# Patient Record
Sex: MALE | Race: ASIAN | NOT HISPANIC OR LATINO
[De-identification: names, ages, dates, MRNs, and addresses within clinical notes are randomized per-mention and may not be internally consistent; named-entity substitution may affect disease eponyms.]

---

## 2023-07-24 PROBLEM — Z00.00 ENCOUNTER FOR PREVENTIVE HEALTH EXAMINATION: Status: ACTIVE | Noted: 2023-07-24

## 2023-07-26 PROBLEM — F17.200 CURRENT SMOKER: Status: ACTIVE | Noted: 2023-07-26

## 2023-07-26 RX ORDER — ATORVASTATIN CALCIUM 10 MG/1
10 TABLET, FILM COATED ORAL
Refills: 0 | Status: ACTIVE | COMMUNITY

## 2023-07-26 RX ORDER — OMEGA-3-ACID ETHYL ESTERS CAPSULES 1 G/1
1 CAPSULE, LIQUID FILLED ORAL
Refills: 0 | Status: ACTIVE | COMMUNITY

## 2023-07-28 ENCOUNTER — APPOINTMENT (OUTPATIENT)
Dept: THORACIC SURGERY | Facility: CLINIC | Age: 58
End: 2023-07-28
Payer: MEDICAID

## 2023-07-28 VITALS
OXYGEN SATURATION: 99 % | HEIGHT: 66 IN | SYSTOLIC BLOOD PRESSURE: 116 MMHG | HEART RATE: 71 BPM | DIASTOLIC BLOOD PRESSURE: 68 MMHG | BODY MASS INDEX: 23.78 KG/M2 | TEMPERATURE: 96.9 F | WEIGHT: 148 LBS

## 2023-07-28 DIAGNOSIS — Z01.818 ENCOUNTER FOR OTHER PREPROCEDURAL EXAMINATION: ICD-10-CM

## 2023-07-28 DIAGNOSIS — F17.200 NICOTINE DEPENDENCE, UNSPECIFIED, UNCOMPLICATED: ICD-10-CM

## 2023-07-28 DIAGNOSIS — R91.1 SOLITARY PULMONARY NODULE: ICD-10-CM

## 2023-07-28 DIAGNOSIS — Z86.39 PERSONAL HISTORY OF OTHER ENDOCRINE, NUTRITIONAL AND METABOLIC DISEASE: ICD-10-CM

## 2023-07-28 PROCEDURE — 99205 OFFICE O/P NEW HI 60 MIN: CPT

## 2023-07-31 PROBLEM — Z86.39 HISTORY OF HYPERLIPIDEMIA: Status: RESOLVED | Noted: 2023-07-31 | Resolved: 2023-07-31

## 2023-07-31 NOTE — PHYSICAL EXAM
[General Appearance - Alert] : alert [General Appearance - In No Acute Distress] : in no acute distress [Sclera] : the sclera and conjunctiva were normal [PERRL With Normal Accommodation] : pupils were equal in size, round, and reactive to light [Extraocular Movements] : extraocular movements were intact [Outer Ear] : the ears and nose were normal in appearance [Oropharynx] : the oropharynx was normal [Neck Appearance] : the appearance of the neck was normal [Neck Cervical Mass (___cm)] : no neck mass was observed [Jugular Venous Distention Increased] : there was no jugular-venous distention [Thyroid Diffuse Enlargement] : the thyroid was not enlarged [Thyroid Nodule] : there were no palpable thyroid nodules [Auscultation Breath Sounds / Voice Sounds] : lungs were clear to auscultation bilaterally [Heart Rate And Rhythm] : heart rate was normal and rhythm regular [Heart Sounds] : normal S1 and S2 [Heart Sounds Gallop] : no gallops [Murmurs] : no murmurs [Heart Sounds Pericardial Friction Rub] : no pericardial rub [Abnormal Walk] : normal gait [Nail Clubbing] : no clubbing  or cyanosis of the fingernails [Musculoskeletal - Swelling] : no joint swelling seen [Motor Tone] : muscle strength and tone were normal [Skin Color & Pigmentation] : normal skin color and pigmentation [Skin Turgor] : normal skin turgor [] : no rash [Deep Tendon Reflexes (DTR)] : deep tendon reflexes were 2+ and symmetric [Sensation] : the sensory exam was normal to light touch and pinprick [No Focal Deficits] : no focal deficits [Oriented To Time, Place, And Person] : oriented to person, place, and time [Impaired Insight] : insight and judgment were intact [Affect] : the affect was normal

## 2023-08-04 NOTE — ASSESSMENT
[FreeTextEntry1] : 57 year old M, smoker (30y x 1ppd), with PMHx of HLD , who is referred by Dr. Rosangela Gonsalez for an initial evaluation of an enlarging ground-glass nodule in the right lower lobe.   Patient has had chronic unproductive cough, worse if smoking. There's no unintentional weight loss, headache, anorexia, fatigue, chest pain, dyspnea, or hemoptysis.  CT chest on 07/05/2023 was reviewed and compared to the prior study. Explained to the patient that the ground glass nodule in the RLL adjacent to an anterior thoracic osteophyte had been increasing in size, which bg up the concern of malignancy. I suggested surgical resection for definitive treatment and diagnosis.   The risks, benefits and alternatives of proceeding with Right Video-Assisted Thoracoscopic Surgery, Robotic Assisted, Right lower lobe segmentectomy, possible lobectomy and MLND were discussed with the patient. Specifically, the risk of bleeding, need for a blood transfusion, conversion to an open procedure, prolonged air leak, dysrhythmia, myocardial infarction, deep venous thrombosis, pulmonary embolus, postoperative pain syndrome, infection, risk of recurrent symptoms, need for continued follow-up and management, as well as mortality was discussed with the patient, who understood and agreed to the above.  Patient was suggested to stop smoking as soon as possible to avoid postoperative complications.   Plan: -	PET/CT to determine suspicion for malignancy and exclude extra-thoracic disease. -	Medical clearance -	PST and EKG with PCP  -       Smoking cessation   Hilda GREEN FNP, am scribing for and the presence of Dr. SRIKANTH BRIDGES the following sections: history of present illness, past medical/family/surgical/family/social history, review of systems, vital signs, physical exam, and disposition.  SRIKANTH GREEN, personally performed the services described in the documentation, reviewed the documentation recorded by the scribe in my presence and it accurately and completely records my words and actions.

## 2023-08-04 NOTE — CONSULT LETTER
[FreeTextEntry2] : Dr. Rosangela Gonsalez [FreeTextEntry3] : Jake Prater MD\par Professor, Cardiovascular & Thoracic Surgery\par [employer]\par Director of the [facility] \par Director of Thoracic Surgery, [facility]\par \par [facility]\par [address]\par [facility] 4th Floor\par [place] [address]\par Phone: [phone]\par Fax: [phone]\par \par \par

## 2023-08-04 NOTE — HISTORY OF PRESENT ILLNESS
[FreeTextEntry1] : WEILI LIN is a 57 year old M, Mandarin speaking, smoker (30y x 1ppd), with PMHx of HLD , who is referred by Dr. Rosangela Gonsalez for an initial evaluation of an enlarging ground-glass nodule in the right lower lobe.   Patient reports chronic unproductive cough, worse if smoking. Denies hx of lung TB or COVID infection. Denies unintentional weight loss, headache, anorexia, fatigue, chest pain, dyspnea, or hemoptysis.  PFT on 07/18/2023 FEV1 = 3.03, 109% of predicted value, FVC = 3.80, 98% of predicted value, FEV1/FVC = 80, 111% of predicted value and DLCO = 26.95, 98% of predicted value.  CT chest on 07/05/2023 - GGN in the RLL adjacent to an anterior thoracic osteophyte is increased in size from prior study, now measuring 1.7cm.  - Few other pulmonary nodules measuring up to 4mm are similar to 2022.  - Sequela of prior granulomatous disease   CT chest on 11/02/2022 - GGN in the RLL adjacent to an anterior thoracic osteophyte, 1.1cm, similar.

## 2023-08-23 NOTE — H&P ADULT - NSHPOUTPATIENTPROVIDERS_GEN_ALL_CORE
Ref/PULM:   DR. ENMANUEL THOMAS  [address], [place], [place], [address]  P: [phone] (ZAYRA)   P: [phone]  F: [phone]  F: [phone]    PCP: Dr. Rosangela Gonsalez  [address], [place] [address]  T: [phone]  f: [phone]

## 2023-08-23 NOTE — H&P ADULT - NSHPREVIEWOFSYSTEMS_GEN_ALL_CORE
Respiratory: as noted in HPI.    Constitutional, Eyes, Cardiovascular, Gastrointestinal, Genitourinary, Musculoskeletal, Integumentary, Neurological and Psychiatric are otherwise negative.

## 2023-08-23 NOTE — H&P ADULT - NSHPPHYSICALEXAM_GEN_ALL_CORE
wt: 148 lb   Constitutional: alert and in no acute distress.  Eyes: the sclera and conjunctiva were normal, pupils were equal in size, round, and reactive to light and extraocular movements were intact.  ENT: the ears and nose were normal in appearance . the oropharynx was normal.  Neck: the appearance of the neck was normal, no neck mass was observed, the thyroid was not enlarged and there were no palpable thyroid nodules . there was no jugular-venous distention.  Pulmonary: no respiratory distress and lungs were clear to auscultation bilaterally.  Heart: heart rate was normal and rhythm regular, normal S1 and S2, no gallops, no murmurs and no pericardial rub.  Musculoskeletal: normal gait, no clubbing or cyanosis of the fingernails, no joint swelling seen and muscle strength and tone were normal.  Skin: normal skin color and pigmentation, normal skin turgor and no rash.  Neurological: deep tendon reflexes were 2+ and symmetric, the sensory exam was normal to light touch and pinprick and no focal deficits.  Psychiatric: oriented to person, place, and time, insight and judgment were intact and the affect was normal.

## 2023-08-23 NOTE — H&P ADULT - HISTORY OF PRESENT ILLNESS
WEILI LIN is a 57 year old M, Mandarin speaking, smoker (30y x 1ppd), with PMHx of HLD , who is referred by Dr. Rosangela Gonsalez for an initial evaluation of an enlarging ground-glass nodule in the right lower lobe.  ?  Patient reports chronic unproductive cough, worse if smoking. Denies hx of lung TB or COVID infection. Denies unintentional weight loss, headache, anorexia, fatigue, chest pain, dyspnea, or hemoptysis.

## 2023-08-23 NOTE — H&P ADULT - NSHPLABSRESULTS_GEN_ALL_CORE
PFT on 07/18/2023  FEV1 = 3.03, 109% of predicted value, FVC = 3.80, 98% of predicted value, FEV1/FVC = 80, 111% of predicted value and DLCO = 26.95, 98% of predicted value.  ?  CT chest on 07/05/2023  - GGN in the RLL adjacent to an anterior thoracic osteophyte is increased in size from prior study, now measuring 1.7cm.  - Few other pulmonary nodules measuring up to 4mm are similar to 2022.  - Sequela of prior granulomatous disease  ?  CT chest on 11/02/2022  - GGN in the RLL adjacent to an anterior thoracic osteophyte, 1.1cm, similar.

## 2023-08-24 ENCOUNTER — TRANSCRIPTION ENCOUNTER (OUTPATIENT)
Age: 58
End: 2023-08-24

## 2023-08-25 ENCOUNTER — INPATIENT (INPATIENT)
Facility: HOSPITAL | Age: 58
LOS: 0 days | Discharge: HOME | DRG: 165 | End: 2023-08-26
Attending: THORACIC SURGERY (CARDIOTHORACIC VASCULAR SURGERY) | Admitting: THORACIC SURGERY (CARDIOTHORACIC VASCULAR SURGERY)
Payer: COMMERCIAL

## 2023-08-25 ENCOUNTER — RESULT REVIEW (OUTPATIENT)
Age: 58
End: 2023-08-25

## 2023-08-25 ENCOUNTER — TRANSCRIPTION ENCOUNTER (OUTPATIENT)
Age: 58
End: 2023-08-25

## 2023-08-25 ENCOUNTER — APPOINTMENT (OUTPATIENT)
Dept: THORACIC SURGERY | Facility: HOSPITAL | Age: 58
End: 2023-08-25

## 2023-08-25 VITALS
WEIGHT: 145.95 LBS | OXYGEN SATURATION: 99 % | HEART RATE: 50 BPM | HEIGHT: 66 IN | SYSTOLIC BLOOD PRESSURE: 114 MMHG | TEMPERATURE: 97 F | RESPIRATION RATE: 16 BRPM | DIASTOLIC BLOOD PRESSURE: 75 MMHG

## 2023-08-25 LAB
ANION GAP SERPL CALC-SCNC: 7 MMOL/L — SIGNIFICANT CHANGE UP (ref 5–17)
APTT BLD: 28.8 SEC — SIGNIFICANT CHANGE UP (ref 24.5–35.6)
BASOPHILS # BLD AUTO: 0 K/UL — SIGNIFICANT CHANGE UP (ref 0–0.2)
BASOPHILS NFR BLD AUTO: 0 % — SIGNIFICANT CHANGE UP (ref 0–2)
BLD GP AB SCN SERPL QL: NEGATIVE — SIGNIFICANT CHANGE UP
BUN SERPL-MCNC: 11 MG/DL — SIGNIFICANT CHANGE UP (ref 7–23)
BURR CELLS BLD QL SMEAR: PRESENT — SIGNIFICANT CHANGE UP
CALCIUM SERPL-MCNC: 8.2 MG/DL — LOW (ref 8.4–10.5)
CHLORIDE SERPL-SCNC: 107 MMOL/L — SIGNIFICANT CHANGE UP (ref 96–108)
CO2 SERPL-SCNC: 24 MMOL/L — SIGNIFICANT CHANGE UP (ref 22–31)
CREAT SERPL-MCNC: 0.72 MG/DL — SIGNIFICANT CHANGE UP (ref 0.5–1.3)
EGFR: 107 ML/MIN/1.73M2 — SIGNIFICANT CHANGE UP
EOSINOPHIL # BLD AUTO: 0 K/UL — SIGNIFICANT CHANGE UP (ref 0–0.5)
EOSINOPHIL NFR BLD AUTO: 0 % — SIGNIFICANT CHANGE UP (ref 0–6)
GIANT PLATELETS BLD QL SMEAR: PRESENT — SIGNIFICANT CHANGE UP
GLUCOSE SERPL-MCNC: 127 MG/DL — HIGH (ref 70–99)
GRAM STN FLD: SIGNIFICANT CHANGE UP
GRAM STN FLD: SIGNIFICANT CHANGE UP
HCT VFR BLD CALC: 32.5 % — LOW (ref 39–50)
HGB BLD-MCNC: 10.7 G/DL — LOW (ref 13–17)
INR BLD: 1 — SIGNIFICANT CHANGE UP (ref 0.85–1.18)
LYMPHOCYTES # BLD AUTO: 0.6 K/UL — LOW (ref 1–3.3)
LYMPHOCYTES # BLD AUTO: 4.5 % — LOW (ref 13–44)
MANUAL SMEAR VERIFICATION: SIGNIFICANT CHANGE UP
MCHC RBC-ENTMCNC: 21 PG — LOW (ref 27–34)
MCHC RBC-ENTMCNC: 32.9 GM/DL — SIGNIFICANT CHANGE UP (ref 32–36)
MCV RBC AUTO: 63.9 FL — LOW (ref 80–100)
MONOCYTES # BLD AUTO: 0.24 K/UL — SIGNIFICANT CHANGE UP (ref 0–0.9)
MONOCYTES NFR BLD AUTO: 1.8 % — LOW (ref 2–14)
NEUTROPHILS # BLD AUTO: 12.47 K/UL — HIGH (ref 1.8–7.4)
NEUTROPHILS NFR BLD AUTO: 93.7 % — HIGH (ref 43–77)
OVALOCYTES BLD QL SMEAR: SLIGHT — SIGNIFICANT CHANGE UP
PLAT MORPH BLD: ABNORMAL
PLATELET # BLD AUTO: 264 K/UL — SIGNIFICANT CHANGE UP (ref 150–400)
POIKILOCYTOSIS BLD QL AUTO: SLIGHT — SIGNIFICANT CHANGE UP
POLYCHROMASIA BLD QL SMEAR: SLIGHT — SIGNIFICANT CHANGE UP
POTASSIUM SERPL-MCNC: 4.1 MMOL/L — SIGNIFICANT CHANGE UP (ref 3.5–5.3)
POTASSIUM SERPL-SCNC: 4.1 MMOL/L — SIGNIFICANT CHANGE UP (ref 3.5–5.3)
PROTHROM AB SERPL-ACNC: 11.4 SEC — SIGNIFICANT CHANGE UP (ref 9.5–13)
RBC # BLD: 5.09 M/UL — SIGNIFICANT CHANGE UP (ref 4.2–5.8)
RBC # FLD: 16.1 % — HIGH (ref 10.3–14.5)
RBC BLD AUTO: ABNORMAL
RH IG SCN BLD-IMP: POSITIVE — SIGNIFICANT CHANGE UP
SCHISTOCYTES BLD QL AUTO: SLIGHT — SIGNIFICANT CHANGE UP
SODIUM SERPL-SCNC: 138 MMOL/L — SIGNIFICANT CHANGE UP (ref 135–145)
SPECIMEN SOURCE: SIGNIFICANT CHANGE UP
SPECIMEN SOURCE: SIGNIFICANT CHANGE UP
TARGETS BLD QL SMEAR: SLIGHT — SIGNIFICANT CHANGE UP
WBC # BLD: 13.31 K/UL — HIGH (ref 3.8–10.5)
WBC # FLD AUTO: 13.31 K/UL — HIGH (ref 3.8–10.5)

## 2023-08-25 PROCEDURE — 88332 PATH CONSLTJ SURG EA ADD BLK: CPT | Mod: 26

## 2023-08-25 PROCEDURE — 88305 TISSUE EXAM BY PATHOLOGIST: CPT | Mod: 26

## 2023-08-25 PROCEDURE — 32667 THORACOSCOPY W/W RESECT ADDL: CPT | Mod: AS

## 2023-08-25 PROCEDURE — 32666 THORACOSCOPY W/WEDGE RESECT: CPT

## 2023-08-25 PROCEDURE — 88307 TISSUE EXAM BY PATHOLOGIST: CPT | Mod: 26

## 2023-08-25 PROCEDURE — 32674 THORACOSCOPY LYMPH NODE EXC: CPT | Mod: AS

## 2023-08-25 PROCEDURE — S2900 ROBOTIC SURGICAL SYSTEM: CPT | Mod: NC

## 2023-08-25 PROCEDURE — 32667 THORACOSCOPY W/W RESECT ADDL: CPT

## 2023-08-25 PROCEDURE — 32666 THORACOSCOPY W/WEDGE RESECT: CPT | Mod: AS

## 2023-08-25 PROCEDURE — 71045 X-RAY EXAM CHEST 1 VIEW: CPT | Mod: 26

## 2023-08-25 PROCEDURE — 32674 THORACOSCOPY LYMPH NODE EXC: CPT

## 2023-08-25 PROCEDURE — 88331 PATH CONSLTJ SURG 1 BLK 1SPC: CPT | Mod: 26

## 2023-08-25 DEVICE — SURGICEL 4 X 8": Type: IMPLANTABLE DEVICE | Status: FUNCTIONAL

## 2023-08-25 DEVICE — STAPLER COVIDIEN TRI-STAPLE 60MM BLACK RELOAD: Type: IMPLANTABLE DEVICE | Status: FUNCTIONAL

## 2023-08-25 DEVICE — STAPLER COVIDIEN TRI-STAPLE 45MM BLACK RELOAD: Type: IMPLANTABLE DEVICE | Status: FUNCTIONAL

## 2023-08-25 DEVICE — STAPLER COVIDIEN TRI-STAPLE 45MM PURPLE RELOAD: Type: IMPLANTABLE DEVICE | Status: FUNCTIONAL

## 2023-08-25 DEVICE — CHEST DRAIN THORACIC PVC 28FR STRAIGHT: Type: IMPLANTABLE DEVICE | Status: FUNCTIONAL

## 2023-08-25 RX ORDER — ACETAMINOPHEN 500 MG
650 TABLET ORAL EVERY 6 HOURS
Refills: 0 | Status: DISCONTINUED | OUTPATIENT
Start: 2023-08-26 | End: 2023-08-26

## 2023-08-25 RX ORDER — ACETAMINOPHEN 500 MG
975 TABLET ORAL ONCE
Refills: 0 | Status: COMPLETED | OUTPATIENT
Start: 2023-08-25 | End: 2023-08-25

## 2023-08-25 RX ORDER — ACETAMINOPHEN 500 MG
1000 TABLET ORAL ONCE
Refills: 0 | Status: DISCONTINUED | OUTPATIENT
Start: 2023-08-25 | End: 2023-08-26

## 2023-08-25 RX ORDER — VITAMIN E 100 UNIT
0 CAPSULE ORAL
Refills: 0 | DISCHARGE

## 2023-08-25 RX ORDER — GABAPENTIN 400 MG/1
300 CAPSULE ORAL ONCE
Refills: 0 | Status: COMPLETED | OUTPATIENT
Start: 2023-08-25 | End: 2023-08-25

## 2023-08-25 RX ORDER — SODIUM CHLORIDE 9 MG/ML
1000 INJECTION, SOLUTION INTRAVENOUS
Refills: 0 | Status: DISCONTINUED | OUTPATIENT
Start: 2023-08-25 | End: 2023-08-26

## 2023-08-25 RX ORDER — HEPARIN SODIUM 5000 [USP'U]/ML
5000 INJECTION INTRAVENOUS; SUBCUTANEOUS ONCE
Refills: 0 | Status: COMPLETED | OUTPATIENT
Start: 2023-08-25 | End: 2023-08-25

## 2023-08-25 RX ORDER — FAMOTIDINE 10 MG/ML
20 INJECTION INTRAVENOUS EVERY 12 HOURS
Refills: 0 | Status: DISCONTINUED | OUTPATIENT
Start: 2023-08-25 | End: 2023-08-26

## 2023-08-25 RX ORDER — FLUTICASONE FUROATE, UMECLIDINIUM BROMIDE AND VILANTEROL TRIFENATATE 200; 62.5; 25 UG/1; UG/1; UG/1
1 POWDER RESPIRATORY (INHALATION)
Refills: 0 | DISCHARGE

## 2023-08-25 RX ORDER — ATORVASTATIN CALCIUM 80 MG/1
1 TABLET, FILM COATED ORAL
Refills: 0 | DISCHARGE

## 2023-08-25 RX ORDER — KETOROLAC TROMETHAMINE 30 MG/ML
15 SYRINGE (ML) INJECTION EVERY 4 HOURS
Refills: 0 | Status: DISCONTINUED | OUTPATIENT
Start: 2023-08-25 | End: 2023-08-26

## 2023-08-25 RX ORDER — CEFAZOLIN SODIUM 1 G
2000 VIAL (EA) INJECTION EVERY 8 HOURS
Refills: 0 | Status: COMPLETED | OUTPATIENT
Start: 2023-08-25 | End: 2023-08-26

## 2023-08-25 RX ORDER — SENNA PLUS 8.6 MG/1
2 TABLET ORAL AT BEDTIME
Refills: 0 | Status: DISCONTINUED | OUTPATIENT
Start: 2023-08-25 | End: 2023-08-26

## 2023-08-25 RX ORDER — PREGABALIN 225 MG/1
0 CAPSULE ORAL
Refills: 0 | DISCHARGE

## 2023-08-25 RX ORDER — HEPARIN SODIUM 5000 [USP'U]/ML
5000 INJECTION INTRAVENOUS; SUBCUTANEOUS EVERY 8 HOURS
Refills: 0 | Status: DISCONTINUED | OUTPATIENT
Start: 2023-08-25 | End: 2023-08-26

## 2023-08-25 RX ORDER — ATORVASTATIN CALCIUM 80 MG/1
10 TABLET, FILM COATED ORAL AT BEDTIME
Refills: 0 | Status: DISCONTINUED | OUTPATIENT
Start: 2023-08-25 | End: 2023-08-26

## 2023-08-25 RX ADMIN — Medication 15 MILLIGRAM(S): at 21:37

## 2023-08-25 RX ADMIN — Medication 100 MILLIGRAM(S): at 18:54

## 2023-08-25 RX ADMIN — Medication 975 MILLIGRAM(S): at 07:27

## 2023-08-25 RX ADMIN — SODIUM CHLORIDE 60 MILLILITER(S): 9 INJECTION, SOLUTION INTRAVENOUS at 14:46

## 2023-08-25 RX ADMIN — SODIUM CHLORIDE 60 MILLILITER(S): 9 INJECTION, SOLUTION INTRAVENOUS at 18:51

## 2023-08-25 RX ADMIN — Medication 15 MILLIGRAM(S): at 21:52

## 2023-08-25 RX ADMIN — HEPARIN SODIUM 5000 UNIT(S): 5000 INJECTION INTRAVENOUS; SUBCUTANEOUS at 07:28

## 2023-08-25 RX ADMIN — ATORVASTATIN CALCIUM 10 MILLIGRAM(S): 80 TABLET, FILM COATED ORAL at 21:36

## 2023-08-25 RX ADMIN — HEPARIN SODIUM 5000 UNIT(S): 5000 INJECTION INTRAVENOUS; SUBCUTANEOUS at 21:36

## 2023-08-25 RX ADMIN — GABAPENTIN 300 MILLIGRAM(S): 400 CAPSULE ORAL at 07:28

## 2023-08-25 RX ADMIN — FAMOTIDINE 20 MILLIGRAM(S): 10 INJECTION INTRAVENOUS at 18:55

## 2023-08-25 RX ADMIN — SENNA PLUS 2 TABLET(S): 8.6 TABLET ORAL at 21:36

## 2023-08-25 NOTE — PROGRESS NOTE ADULT - ASSESSMENT
Assessment:     WEILI LIN is a 57 year old Mardarin speaking male with past medical history of tobacco use (30y x 1ppd) and HLD. Referred to our service by Dr. Rosangela Gonsalez for evaluation of an enlarging ground-glass nodule in the right lower lobe. Presented to our facility this morning and underwent RA R VATS and RLL wedge x 3, MLND, frozen path consistent with inflammatory disease. Transferred to PACU postoperatively w/ 1 CT to suction, no air leak, CXR stable. Pain well controlled on current regimen. Transfer to telemetry for continued postop mgmt.     Plan:    Neurovascular:   -Pain well controlled with current regimen.   -PRN's: tylenol, toradol     Cardiovascular:   HLD  - continue statin   -Hemodynamically stable.   -Monitor: BP, HR, tele    Respiratory:   RLL ground-glass nodule  - s/p RA R VATS and RLL wedge x 3, MLND, frozen path consistent with inflammatory disease   - 1 CT to suction, no air leak, CXR stable without PNX   -Oxygenating well on room air  -Encourage continued use of IS 10x/hr and frequent ambulation  -CXR: no pneumothorax or significant pleural effusion    GI:  -GI PPX: pepcid   -PO Diet  -Bowel Regimen: senna     Renal / :  -Continue to monitor renal function: BUN/Cr: 0.72/11  -Monitor I/O's daily     Endocrine:    -No hx of DM or thyroid dx    Hematologic:  Anemia, postop   -CBC: H/H- 10.7/32.5  -Coagulation Panel.    ID:  -Temperature: afebrile   -CBC: WBC 13.31, likely reactive   -Continue to observe for SIRS/Sepsis Syndrome.    Prophylaxis:  -DVT prophylaxis with SubQ Lovenox   -Continue with SCD's b/l while patient is at rest     Disposition:  POD0 s/p R VATS; 1 CT to suction w/ no air leak; CXR stable  Transfer to tele for continued postop mgmt   CXR and labs in AM    Assessment:     WEILI LIN is a 57 year old Mardarin speaking male with past medical history of tobacco use (30y x 1ppd) and HLD. Referred to our service by Dr. Rosangela Gonsalez for evaluation of an enlarging ground-glass nodule in the right lower lobe. Presented to our facility this morning and underwent RA R VATS and RLL wedge x 3, MLND, frozen path consistent with inflammatory disease. Transferred to PACU postoperatively w/ 1 CT to suction, 1/5 air leak, CXR stable. Pain well controlled on current regimen. Transfer to telemetry for continued postop mgmt.     Plan:    Neurovascular:   -Pain well controlled with current regimen.   -PRN's: tylenol, toradol     Cardiovascular:   HLD  - continue statin   -Hemodynamically stable.   -Monitor: BP, HR, tele    Respiratory:   RLL ground-glass nodule  - s/p RA R VATS and RLL wedge x 3, MLND, frozen path consistent with inflammatory disease   - 1 CT to suction, 1/5 air leak, CXR stable without PNX   -Oxygenating well on room air  -Encourage continued use of IS 10x/hr and frequent ambulation  -CXR: no pneumothorax or significant pleural effusion    GI:  -GI PPX: pepcid   -PO Diet  -Bowel Regimen: senna     Renal / :  -Continue to monitor renal function: BUN/Cr: 0.72/11  -Monitor I/O's daily     Endocrine:    -No hx of DM or thyroid dx    Hematologic:  Anemia, postop   -CBC: H/H- 10.7/32.5  -Coagulation Panel.    ID:  -Temperature: afebrile   -CBC: WBC 13.31, likely reactive   -Continue to observe for SIRS/Sepsis Syndrome.    Prophylaxis:  -DVT prophylaxis with SubQ Lovenox   -Continue with SCD's b/l while patient is at rest     Disposition:  POD0 s/p R VATS; 1 CT to suction w/ 1/5 air leak; CXR stable  Transfer to tele for continued postop mgmt   CXR and labs in AM

## 2023-08-25 NOTE — PROGRESS NOTE ADULT - SUBJECTIVE AND OBJECTIVE BOX
Patient discussed with Dr. Prater     OPERATION & DATE: S/p RA R VATS, RLL wedge, MLND today     SUBJECTIVE ASSESSMENT: Seen in PACU in NAD. Mild incisional site pain. Denies chest pain or SOB.     VITAL SIGNS:  Vital Signs Last 24 Hrs  T(C): 36.4 (25 Aug 2023 12:11), Max: 36.4 (25 Aug 2023 12:11)  T(F): 97.6 (25 Aug 2023 12:11), Max: 97.6 (25 Aug 2023 12:11)  HR: 70 (25 Aug 2023 15:11) (50 - 73)  BP: 107/65 (25 Aug 2023 15:11) (93/59 - 114/75)  BP(mean): 82 (25 Aug 2023 15:11) (72 - 82)  RR: 16 (25 Aug 2023 15:11) (16 - 17)  SpO2: 100% (25 Aug 2023 15:11) (97% - 100%)    Parameters below as of 25 Aug 2023 15:11  Patient On (Oxygen Delivery Method): nasal cannula  O2 Flow (L/min): 3    I&O's Detail    25 Aug 2023 07:01  -  25 Aug 2023 16:52  --------------------------------------------------------  IN:    Lactated Ringers: 60 mL  Total IN: 60 mL    OUT:    Chest Tube (mL): 2 mL  Total OUT: 2 mL    Total NET: 58 mL    CHEST TUBE: y   MARIA ANTONIA DRAIN:  n  EPICARDIAL WIRES: n  STITCHES:y  STAPLES:n  AVINA: n  CENTRAL LINE:n  MIDLINE/PICC:n  WOUND VAC:n    PHYSICAL EXAM:  General: NAD, sitting comfortably in chair, conversing appropriately  Neurological: alert and oriented, UE and LE strength equal b/l, facial symmetry present  Cardiovascular: RRR, Clear S1 and S2, no murmurs appreciated  Respiratory: chest expansion symmetrical, CTA b/l, no wheezing noted  Gastrointestinal: +BS, soft, NT, ND  Extremities: moving spontaneously, no calf tenderness or edema.  Vascular: warm, well perfused. DP/PT pulses palpable b/l.  Incisions: R VATS incision C/D/I, CT site dressing C/D/I     LABS:                        10.7   13.31 )-----------( 264      ( 25 Aug 2023 12:25 )             32.5     PT/INR - ( 25 Aug 2023 12:25 )   PT: 11.4 sec;   INR: 1.00          PTT - ( 25 Aug 2023 12:25 )  PTT:28.8 sec  08-25    138  |  107  |  11  ----------------------------<  127<H>  4.1   |  24  |  0.72    Ca    8.2<L>      25 Aug 2023 12:25    Urinalysis Basic - ( 25 Aug 2023 12:25 )    Color: x / Appearance: x / SG: x / pH: x  Gluc: 127 mg/dL / Ketone: x  / Bili: x / Urobili: x   Blood: x / Protein: x / Nitrite: x   Leuk Esterase: x / RBC: x / WBC x   Sq Epi: x / Non Sq Epi: x / Bacteria: x    MEDICATIONS  (STANDING):  ceFAZolin   IVPB 2000 milliGRAM(s) IV Intermittent every 8 hours  famotidine    Tablet 20 milliGRAM(s) Oral every 12 hours  lactated ringers. 1000 milliLiter(s) (60 mL/Hr) IV Continuous <Continuous>    MEDICATIONS  (PRN):  acetaminophen   IVPB .. 1000 milliGRAM(s) IV Intermittent once PRN Temp greater or equal to 38C (100.4F), Mild Pain (1 - 3)  ketorolac   Injectable 15 milliGRAM(s) IV Push every 4 hours PRN Moderate Pain (4 - 6)    RADIOLOGY & ADDITIONAL TESTS:    CXR 8/25/2023:  New bibasilar atelectasis. No pneumothorax or significant pleural effusion. Apically directed right chest tube in situ. Cardiac silhouette within normal limits.

## 2023-08-25 NOTE — BRIEF OPERATIVE NOTE - NSICDXBRIEFPROCEDURE_GEN_ALL_CORE_FT
PROCEDURES:  Wedge resection, lung, robot-assisted, using VATS, using da Steven Xi 25-Aug-2023 12:16:35 RVATS, Robotic assisted, RLL wedge resection x 3 . Mediastinal lymph node dissection Sue Lynch

## 2023-08-25 NOTE — PRE-ANESTHESIA EVALUATION ADULT - NSANTHOSAYNRD_GEN_A_CORE
No. MAINE screening performed.  STOP BANG Legend: 0-2 = LOW Risk; 3-4 = INTERMEDIATE Risk; 5-8 = HIGH Risk

## 2023-08-26 ENCOUNTER — TRANSCRIPTION ENCOUNTER (OUTPATIENT)
Age: 58
End: 2023-08-26

## 2023-08-26 VITALS — TEMPERATURE: 97 F

## 2023-08-26 LAB
ANION GAP SERPL CALC-SCNC: 10 MMOL/L — SIGNIFICANT CHANGE UP (ref 5–17)
BASOPHILS # BLD AUTO: 0.01 K/UL — SIGNIFICANT CHANGE UP (ref 0–0.2)
BASOPHILS NFR BLD AUTO: 0.1 % — SIGNIFICANT CHANGE UP (ref 0–2)
BUN SERPL-MCNC: 12 MG/DL — SIGNIFICANT CHANGE UP (ref 7–23)
CALCIUM SERPL-MCNC: 8.7 MG/DL — SIGNIFICANT CHANGE UP (ref 8.4–10.5)
CHLORIDE SERPL-SCNC: 104 MMOL/L — SIGNIFICANT CHANGE UP (ref 96–108)
CO2 SERPL-SCNC: 24 MMOL/L — SIGNIFICANT CHANGE UP (ref 22–31)
CREAT SERPL-MCNC: 0.86 MG/DL — SIGNIFICANT CHANGE UP (ref 0.5–1.3)
EGFR: 102 ML/MIN/1.73M2 — SIGNIFICANT CHANGE UP
EOSINOPHIL # BLD AUTO: 0 K/UL — SIGNIFICANT CHANGE UP (ref 0–0.5)
EOSINOPHIL NFR BLD AUTO: 0 % — SIGNIFICANT CHANGE UP (ref 0–6)
GLUCOSE SERPL-MCNC: 140 MG/DL — HIGH (ref 70–99)
HCT VFR BLD CALC: 32.5 % — LOW (ref 39–50)
HGB BLD-MCNC: 10.5 G/DL — LOW (ref 13–17)
IMM GRANULOCYTES NFR BLD AUTO: 0.3 % — SIGNIFICANT CHANGE UP (ref 0–0.9)
LYMPHOCYTES # BLD AUTO: 1.4 K/UL — SIGNIFICANT CHANGE UP (ref 1–3.3)
LYMPHOCYTES # BLD AUTO: 14 % — SIGNIFICANT CHANGE UP (ref 13–44)
MAGNESIUM SERPL-MCNC: 2.1 MG/DL — SIGNIFICANT CHANGE UP (ref 1.6–2.6)
MCHC RBC-ENTMCNC: 21 PG — LOW (ref 27–34)
MCHC RBC-ENTMCNC: 32.3 GM/DL — SIGNIFICANT CHANGE UP (ref 32–36)
MCV RBC AUTO: 64.9 FL — LOW (ref 80–100)
MONOCYTES # BLD AUTO: 0.72 K/UL — SIGNIFICANT CHANGE UP (ref 0–0.9)
MONOCYTES NFR BLD AUTO: 7.2 % — SIGNIFICANT CHANGE UP (ref 2–14)
NEUTROPHILS # BLD AUTO: 7.86 K/UL — HIGH (ref 1.8–7.4)
NEUTROPHILS NFR BLD AUTO: 78.4 % — HIGH (ref 43–77)
NIGHT BLUE STAIN TISS: SIGNIFICANT CHANGE UP
NIGHT BLUE STAIN TISS: SIGNIFICANT CHANGE UP
NRBC # BLD: 0 /100 WBCS — SIGNIFICANT CHANGE UP (ref 0–0)
PLATELET # BLD AUTO: 281 K/UL — SIGNIFICANT CHANGE UP (ref 150–400)
POTASSIUM SERPL-MCNC: 3.7 MMOL/L — SIGNIFICANT CHANGE UP (ref 3.5–5.3)
POTASSIUM SERPL-SCNC: 3.7 MMOL/L — SIGNIFICANT CHANGE UP (ref 3.5–5.3)
RBC # BLD: 5.01 M/UL — SIGNIFICANT CHANGE UP (ref 4.2–5.8)
RBC # FLD: 16.2 % — HIGH (ref 10.3–14.5)
SODIUM SERPL-SCNC: 138 MMOL/L — SIGNIFICANT CHANGE UP (ref 135–145)
SPECIMEN SOURCE: SIGNIFICANT CHANGE UP
SPECIMEN SOURCE: SIGNIFICANT CHANGE UP
WBC # BLD: 10.02 K/UL — SIGNIFICANT CHANGE UP (ref 3.8–10.5)
WBC # FLD AUTO: 10.02 K/UL — SIGNIFICANT CHANGE UP (ref 3.8–10.5)

## 2023-08-26 PROCEDURE — S2900: CPT

## 2023-08-26 PROCEDURE — C1889: CPT

## 2023-08-26 PROCEDURE — 99222 1ST HOSP IP/OBS MODERATE 55: CPT

## 2023-08-26 PROCEDURE — 87070 CULTURE OTHR SPECIMN AEROBIC: CPT

## 2023-08-26 PROCEDURE — 87102 FUNGUS ISOLATION CULTURE: CPT

## 2023-08-26 PROCEDURE — 85730 THROMBOPLASTIN TIME PARTIAL: CPT

## 2023-08-26 PROCEDURE — 97162 PT EVAL MOD COMPLEX 30 MIN: CPT

## 2023-08-26 PROCEDURE — 71045 X-RAY EXAM CHEST 1 VIEW: CPT | Mod: 26,76

## 2023-08-26 PROCEDURE — 86900 BLOOD TYPING SEROLOGIC ABO: CPT

## 2023-08-26 PROCEDURE — 88331 PATH CONSLTJ SURG 1 BLK 1SPC: CPT

## 2023-08-26 PROCEDURE — 87116 MYCOBACTERIA CULTURE: CPT

## 2023-08-26 PROCEDURE — 80048 BASIC METABOLIC PNL TOTAL CA: CPT

## 2023-08-26 PROCEDURE — 86901 BLOOD TYPING SEROLOGIC RH(D): CPT

## 2023-08-26 PROCEDURE — 88305 TISSUE EXAM BY PATHOLOGIST: CPT

## 2023-08-26 PROCEDURE — 71045 X-RAY EXAM CHEST 1 VIEW: CPT

## 2023-08-26 PROCEDURE — 88307 TISSUE EXAM BY PATHOLOGIST: CPT

## 2023-08-26 PROCEDURE — 87075 CULTR BACTERIA EXCEPT BLOOD: CPT

## 2023-08-26 PROCEDURE — 87206 SMEAR FLUORESCENT/ACID STAI: CPT

## 2023-08-26 PROCEDURE — 86850 RBC ANTIBODY SCREEN: CPT

## 2023-08-26 PROCEDURE — 85610 PROTHROMBIN TIME: CPT

## 2023-08-26 PROCEDURE — 85025 COMPLETE CBC W/AUTO DIFF WBC: CPT

## 2023-08-26 PROCEDURE — 88332 PATH CONSLTJ SURG EA ADD BLK: CPT

## 2023-08-26 PROCEDURE — 36415 COLL VENOUS BLD VENIPUNCTURE: CPT

## 2023-08-26 PROCEDURE — C9399: CPT

## 2023-08-26 PROCEDURE — 87015 SPECIMEN INFECT AGNT CONCNTJ: CPT

## 2023-08-26 PROCEDURE — 83735 ASSAY OF MAGNESIUM: CPT

## 2023-08-26 RX ORDER — ACETAMINOPHEN 500 MG
2 TABLET ORAL
Qty: 56 | Refills: 0
Start: 2023-08-26 | End: 2023-09-01

## 2023-08-26 RX ORDER — OMEGA-3 ACID ETHYL ESTERS 1 G
0 CAPSULE ORAL
Refills: 0 | DISCHARGE

## 2023-08-26 RX ADMIN — Medication 15 MILLIGRAM(S): at 09:25

## 2023-08-26 RX ADMIN — Medication 15 MILLIGRAM(S): at 09:10

## 2023-08-26 RX ADMIN — HEPARIN SODIUM 5000 UNIT(S): 5000 INJECTION INTRAVENOUS; SUBCUTANEOUS at 05:25

## 2023-08-26 RX ADMIN — Medication 100 MILLIGRAM(S): at 02:21

## 2023-08-26 RX ADMIN — FAMOTIDINE 20 MILLIGRAM(S): 10 INJECTION INTRAVENOUS at 05:25

## 2023-08-26 RX ADMIN — Medication 100 MILLIGRAM(S): at 09:10

## 2023-08-26 NOTE — DISCHARGE NOTE PROVIDER - NSDCCPTREATMENT_GEN_ALL_CORE_FT
PRINCIPAL PROCEDURE  Procedure: Wedge resection, lung, robot-assisted, using VATS, using da Steven Xi  Findings and Treatment: RVATS, Robotic assisted, RLL wedge resection x 3 . Mediastinal lymph node dissection

## 2023-08-26 NOTE — CHART NOTE - NSCHARTNOTEFT_GEN_A_CORE
CT Removal:    Pt seen and examined at bedside.  Case discussed with Dr. Jake Prater and is recommending removal of CT.  Minimal output from CT.  No air leak appreciated.  CT removed without incident.  Occlusive dressing placed. Follow up CXR with no obvious PTX noted.  Pt tolerated procedure well and remained hemodynamically stable.

## 2023-08-26 NOTE — DISCHARGE NOTE NURSING/CASE MANAGEMENT/SOCIAL WORK - NSDCPEFALRISK_GEN_ALL_CORE
For information on Fall & Injury Prevention, visit: [email or web address] OR  [email or web address] OR  https://www.cdc.gov/steadi/patient.html

## 2023-08-26 NOTE — PHYSICAL THERAPY INITIAL EVALUATION ADULT - ADDITIONAL COMMENTS
Patient lives with spouse and daughter in private house with 15 steps to enter. Does not use any Assistive Devices at baseline. Denies recent Hx of falls.

## 2023-08-26 NOTE — DISCHARGE NOTE NURSING/CASE MANAGEMENT/SOCIAL WORK - PATIENT PORTAL LINK FT
You can access the FollowMyHealth Patient Portal offered by [facility] by registering at the following website: [email or web address]. By joining [employer]’s FollowMyHealth portal, you will also be able to view your health information using other applications (apps) compatible with our system.

## 2023-08-26 NOTE — DISCHARGE NOTE PROVIDER - NSDCFUADDINST_GEN_ALL_CORE_FT
-You have a stitch in place on your right chest, this will be removed at your follow up appointment. The bandage on your chest can be removed after 48 hours.     -Walk daily as tolerated and use your incentive spirometer 10 times every hour while you are awake.     -Please weigh yourself daily. If you notice over a 3 pound weight gain in 3 days, this is a sign you are likely retaining too much fluid. It is imperative you call our right away with unexplained rapid weight gain.      -Please continue to wear the compression stockings given to you in the hospital at home. This is a way to prevent fluid from building up in your legs.     -No driving or strenuous activity/exercise until cleared by your surgeon.    -Gently clean your incisions with unscented/antibacterial soap and water, pat dry.  You may leave them open to air.    -Call your doctor if you have shortness of breath, chest pain not relieved by pain medication, dizziness, fever >100.5, or increased redness or drainage from incisions.

## 2023-08-26 NOTE — CONSULT NOTE ADULT - ASSESSMENT
WEILI LIN is a 57 year old M, Mandarin speaking, smoker (30y x 1ppd), with PMHx of HLD , who is referred by Dr. Rosangela Gonsalez for an initial evaluation of an enlarging ground-glass nodule in the right lower lobe, sp Wedge resection, lung, robot-assisted, using VATS, using da Steven Xi , RVATS, Robotic assisted, RLL wedge resection x 3 . Mediastinal lymph node dissection 8/25/23     - post op day # 1  recovering well  care by CTS appreciated  labs/imaging reviewed  incentive spirometer encouraged.  pain management.  plan for CT removal today and home today post x ray     cw home meds for Hyperlipidemia-   follow up with Dr. Prater   explained to patient in Mandarin ( writer fluent in Mandarin no  needed  thank you for allowing medicine to participate in the care, dw CTS .

## 2023-08-26 NOTE — PHYSICAL THERAPY INITIAL EVALUATION ADULT - GENERAL OBSERVATIONS, REHAB EVAL
Patient received semi-gordon in bed  in NAD on RA, +SCDs, +Heplock, +Telemetry. Cleared by CRISTOBAL Truong. Agreeable to PT.

## 2023-08-26 NOTE — CONSULT NOTE ADULT - SUBJECTIVE AND OBJECTIVE BOX
HPI:  WEILI LIN is a 57 year old M, Mandarin speaking, smoker (30y x 1ppd), with PMHx of HLD , who is referred by Dr. Rosangela Gonsalez for an initial evaluation of an enlarging ground-glass nodule in the right lower lobe, sp Wedge resection, lung, robot-assisted, using VATS, using da Steven Xi , RVATS, Robotic assisted, RLL wedge resection x 3 . Mediastinal lymph node dissection 8/25/23   encounter 8/26/23- 7:30 am  clinically feels well, pain at the chest tube site ( right   saturating well on room air,  evaluated by CTS this am =plan for CT removal and home today.    PAST MEDICAL & SURGICAL HISTORY:  History of hyperlipidemia      No significant past surgical history        Home Medications:  Lipitor 10 mg oral tablet: 1 orally once a day (at bedtime) (25 Aug 2023 07:15)  Trelegy Ellipta 100 mcg-62.5 mcg-25 mcg/inh inhalation powder: 1 inhaled once a day (25 Aug 2023 07:15)  vitamin b12: 500 mcg daily (25 Aug 2023 07:15)  vitamin d 50,000: daily (25 Aug 2023 07:15)  Vitamin E: 1,000 IU per day (25 Aug 2023 07:15)    Allergies    No Known Allergies    Intolerances      FAMILY HISTORY:  No pertinent family history in first degree relatives      Social History:  Current smoker (305.1) (F17.200) (23 Aug 2023 15:42)      REVIEW OF SYSTEMS:  12 points system reviewed all negative except in [place].    Diet, Regular (08-25-23 @ 16:44) [Active]      CURRENT MEDICATIONS:   acetaminophen     Tablet .. 650 milliGRAM(s) Oral every 6 hours PRN  acetaminophen   IVPB .. 1000 milliGRAM(s) IV Intermittent once PRN  atorvastatin 10 milliGRAM(s) Oral at bedtime  famotidine    Tablet 20 milliGRAM(s) Oral every 12 hours  heparin   Injectable 5000 Unit(s) SubCutaneous every 8 hours  ketorolac   Injectable 15 milliGRAM(s) IV Push every 4 hours PRN  lactated ringers. 1000 milliLiter(s) IV Continuous <Continuous>  senna 2 Tablet(s) Oral at bedtime      VITAL SIGNS, INS/OUTS (last 24 hours):  Vital Signs Last 24 Hrs  T(C): 36.4 (26 Aug 2023 09:22), Max: 36.9 (25 Aug 2023 16:41)  T(F): 97.6 (26 Aug 2023 09:22), Max: 98.4 (25 Aug 2023 16:41)  HR: 77 (26 Aug 2023 11:55) (63 - 77)  BP: 122/75 (26 Aug 2023 11:55) (93/59 - 133/78)  BP(mean): 100 (26 Aug 2023 09:05) (72 - 100)  RR: 15 (26 Aug 2023 09:05) (15 - 18)  SpO2: 97% (26 Aug 2023 11:55) (97% - 100%)    Parameters below as of 26 Aug 2023 11:55  Patient On (Oxygen Delivery Method): room air      I&O's Summary    25 Aug 2023 07:01  -  26 Aug 2023 07:00  --------------------------------------------------------  IN: 1100 mL / OUT: 1602 mL / NET: -502 mL    26 Aug 2023 07:01  -  26 Aug 2023 13:18  --------------------------------------------------------  IN: 50 mL / OUT: 525 mL / NET: -475 mL        PHYSICAL EXAM:  General exam: well built, saturating well on room air.  CVS : RRR, +S1/S2  Pulm: chest tube on right, moving good air on both lung field, reduced on right lower part.  Abd:  BS present, soft, nt, not distended.  Skin: warm and dry,  Ext:  no edema, no tenderness  Neuro: AOx3, no gross focal neurological deficits    BASIC LABS:                        10.5   10.02 )-----------( 281      ( 26 Aug 2023 05:58 )             32.5     08-26    138  |  104  |  12  ----------------------------<  140<H>  3.7   |  24  |  0.86    Ca    8.7      26 Aug 2023 05:58  Mg     2.1     08-26      PT/INR - ( 25 Aug 2023 12:25 )   PT: 11.4 sec;   INR: 1.00          PTT - ( 25 Aug 2023 12:25 )  PTT:28.8 sec  Urinalysis Basic - ( 26 Aug 2023 05:58 )    Color: x / Appearance: x / SG: x / pH: x  Gluc: 140 mg/dL / Ketone: x  / Bili: x / Urobili: x   Blood: x / Protein: x / Nitrite: x   Leuk Esterase: x / RBC: x / WBC x   Sq Epi: x / Non Sq Epi: x / Bacteria: x      CAPILLARY BLOOD GLUCOSE          OTHER LABS:        MICRODATA:    Culture - Tissue with Gram Stain (collected 25 Aug 2023 10:14)  Source: .Tissue #2 Right lower lobe wedge  Gram Stain (25 Aug 2023 16:14):    No organisms seen    No WBC's seen.  Preliminary Report (26 Aug 2023 10:24):    No growth to date    Culture - Tissue with Gram Stain (collected 25 Aug 2023 10:14)  Source: .Tissue #1 Right lower lobe wedge  Gram Stain (25 Aug 2023 16:14):    No organisms seen    No WBC's seen.  Preliminary Report (26 Aug 2023 10:24):    No growth to date        IMAGING:    EKG:     #Diet - Diet, Regular (08-25-23 @ 16:44) [Active]        #DVT PPx -

## 2023-08-26 NOTE — DISCHARGE NOTE PROVIDER - PROVIDER TOKENS
FREE:[LAST:[Dr. Prater],FIRST:[Jake],PHONE:[phone]],FAX:[(   )    -],ADDRESS:[[address] [place] [address]]]

## 2023-08-26 NOTE — PHYSICAL THERAPY INITIAL EVALUATION ADULT - PERTINENT HX OF CURRENT PROBLEM, REHAB EVAL
WEILI LIN is a 57 year old M, Mandarin speaking, smoker (30y x 1ppd), with PMHx of HLD , who is referred by Dr. Rosangela Gonsalez for an initial evaluation of an enlarging ground-glass nodule in the right lower lobe. Now s/p RA R VATS, RLL wedge, MLND   ?

## 2023-08-26 NOTE — DISCHARGE NOTE PROVIDER - HOSPITAL COURSE
Patient discussed on morning rounds with Dr. Jak Arredondo     Operation Date:   8/25/23 -- R VATS RA RLL wedge resection x3 MLND   +frozen for chronic inflammation     Primary Surgeon/Attending MD: Jake Deng    Referring Physician: Dr. Rosangela Gonsalez   _ _ _ _ _ _ _ _ _ _ _ _  HOSPITAL COURSE:  56 y/o M, Mandarin speaking, with a PMHx of tobacco use (30y x 1ppd) and HLD. Referred to our service by Dr. Rosangela Gonsalez for evaluation of an enlarging ground-glass nodule in the right lower lobe. Presented to [facility] with underwent RA R VATS and RLL wedge x 3, MLND, intraoperatively frozen pathology consistent with inflammatory disease. Transferred to PACU postoperatively w/ 1 CT to suction, 1/5 air leak, CXR stable. Pain well controlled on current regimen. Transferred to telemetry for continued postop mgmt. POD1 no air leak seen on morning rounds. Per Dr. Joseph, CT to be removed. CT was removed and post-pull CXR completed and no PTX or acute pathology. Per Dr. Arredondo, pt is medically ready to be discharged home.   _ _ _ _ _ _ _ _ _ _ _ _  DISCHARGE PHYSICAL EXAM:  General: well appearing sitting in chair in NAD   Neuro: AOx3, motor skills grossly intact  Cardio: normal s1/s2  Pulm: lungs cta b/l, no wheezing   GI/: WNL  Vascular: normal DP 2+, radial 2+ b/l  Extremities: no edema, no calf tenderness  Incisions: VATS incisions healing well no erythema, purulence or ecchymosis.   _ _ _ _ _ _ _ _ _ _ _ _  REMOVAL CHECKLIST:        [ n/a] Epicardial wires          [ in place] Stitches/tie downs,   If no, why? __to be removed at follow up appointment ___           [ n/a] PICC/Midline,   If no, why? ________  _ _ _ _ _ _ _ _ _ _ _ _   MEDICATION DISCHARGE CHECKLIST    CABG        [ ] Aspirin, [  ] Contraindicated, Reason_______________________________        [ ] Plavix, [  ] Contraindicated, Reason_______________________________        [ ] Statin, [  ] Contraindicated, Reason_______________________________        [ ] Lasix , [  ] Contraindicated, Reason_______________________________              Duration: _____        [ ] Beta-Blocker, [  ] Contraindicated, Reason_______________________________       Surgical Valve        [ ] Aspirin, [  ] Contraindicated, Reason_______________________________        [ ] Lasix, [  ] Contraindicated, Reason_______________________________             Duration: _____        [ ] Beta-Blocker, [  ] Contraindicated, Reason_______________________________        TAVR Valve        [ ] Aspirin, [  ] Contraindicated, Reason_______________________________        [ ] Plavix, [ ] Contraindicated, Reason _______________________________        PCI        [ ] Aspirin, [  ] Contraindicated, Reason_______________________________        [ ] Plavix, [ ] Contraindicated, Reason _______________________________        Anticoagulation        [ ] NOAC, [ ] Reason _______________________________              Cost/Insurance barriers addressed: YES/NO         [ ] Coumadin, [ ] Reason _______________________________              Dose:___________              INR Goal: ___________              Follow up established: YES/NO  _ _ _ _ _ _ _ _ _ _ _ _  RELEVANT LABS/IMAGING:    _ _ _ _ _ _ _ _ _ _ _ _  CLINICAL FOLLOW UP NEEDS:     [ ] Labwork           Labs needed:           When/Timing:           Outpatient team aware: YES/NO         [ ] Imaging            Type:           When/Timing:           Outpatient team aware: YES/NO       [ ] Home equipment           Type: (i.e. wound vac, pneumostat, prevena, wet/dry dressings, picc/midlines, MCOT, song etc)           Specific needs:           Outpatient team aware: YES/NO  _ _ _ _ _ _ _ _ _ _ _ _  Over 35 minutes was spent with the patient reviewing the discharge material including medications, follow up appointments, recovery, concerning symptoms, and how to contact their health care providers if they have questions   Patient discussed on morning rounds with Dr. Jak Arredondo     Operation Date:   8/25/23 -- R VATS RA RLL wedge resection x3 MLND   +frozen for chronic inflammation     Primary Surgeon/Attending MD: Jake Deng    Referring Physician: Dr. Rosangela Gonsalez   _ _ _ _ _ _ _ _ _ _ _ _  HOSPITAL COURSE:  56 y/o M, Mandarin speaking, with a PMHx of tobacco use (30y x 1ppd) and HLD. Referred to our service by Dr. Rosangela Gonsalez for evaluation of an enlarging ground-glass nodule in the right lower lobe. Presented to [facility] with underwent RA R VATS and RLL wedge x 3, MLND, intraoperatively frozen pathology consistent with inflammatory disease. Transferred to PACU postoperatively w/ 1 CT to suction, 1/5 air leak, CXR stable. Pain well controlled on current regimen. Transferred to telemetry for continued postop mgmt. POD1 no air leak seen on morning rounds. Per Dr. Joseph, CT to be removed. CT was removed and post-pull CXR completed and no PTX or acute pathology. Per Dr. Arredondo, pt is medically ready to be discharged home.   _ _ _ _ _ _ _ _ _ _ _ _  DISCHARGE PHYSICAL EXAM:  General: well appearing sitting in chair in NAD   Neuro: AOx3, motor skills grossly intact  Cardio: normal s1/s2  Pulm: lungs cta b/l, no wheezing   GI/: WNL  Vascular: normal DP 2+, radial 2+ b/l  Extremities: no edema, no calf tenderness  Incisions: VATS incisions healing well no erythema, purulence or ecchymosis.   _ _ _ _ _ _ _ _ _ _ _ _  REMOVAL CHECKLIST:        [ n/a] Epicardial wires          [ in place] Stitches/tie downs,   If no, why? __to be removed at follow up appointment ___           [ n/a] PICC/Midline,   If no, why? ________  _ _ _ _ _ _ _ _ _ _ _ _   MEDICATION DISCHARGE CHECKLIST   None  _ _ _ _ _ _ _ _ _ _ _ _  RELEVANT LABS/IMAGING:  None  _ _ _ _ _ _ _ _ _ _ _ _  CLINICAL FOLLOW UP NEEDS:  None  _ _ _ _ _ _ _ _ _ _ _ _  Over 35 minutes was spent with the patient reviewing the discharge material including medications, follow up appointments, recovery, concerning symptoms, and how to contact their health care providers if they have questions

## 2023-08-26 NOTE — DISCHARGE NOTE PROVIDER - NSDCMRMEDTOKEN_GEN_ALL_CORE_FT
fish oil: 2 g per day  Lipitor 10 mg oral tablet: 1 orally once a day (at bedtime)  Trelegy Ellipta 100 mcg-62.5 mcg-25 mcg/inh inhalation powder: 1 inhaled once a day  vitamin b12: 500 mcg daily  vitamin d 50,000: daily  Vitamin E: 1,000 IU per day   acetaminophen 325 mg oral tablet: 2 tab(s) orally every 6 hours as needed for  mild pain  Lipitor 10 mg oral tablet: 1 orally once a day (at bedtime)  Trelegy Ellipta 100 mcg-62.5 mcg-25 mcg/inh inhalation powder: 1 inhaled once a day  vitamin b12: 500 mcg daily  vitamin d 50,000: daily  Vitamin E: 1,000 IU per day

## 2023-08-30 DIAGNOSIS — R91.1 SOLITARY PULMONARY NODULE: ICD-10-CM

## 2023-08-30 DIAGNOSIS — E78.5 HYPERLIPIDEMIA, UNSPECIFIED: ICD-10-CM

## 2023-08-30 DIAGNOSIS — J98.4 OTHER DISORDERS OF LUNG: ICD-10-CM

## 2023-08-30 DIAGNOSIS — F17.210 NICOTINE DEPENDENCE, CIGARETTES, UNCOMPLICATED: ICD-10-CM

## 2023-08-30 LAB
CULTURE RESULTS: NO GROWTH — SIGNIFICANT CHANGE UP
CULTURE RESULTS: NO GROWTH — SIGNIFICANT CHANGE UP
SPECIMEN SOURCE: SIGNIFICANT CHANGE UP
SPECIMEN SOURCE: SIGNIFICANT CHANGE UP

## 2023-08-31 PROBLEM — Z86.39 PERSONAL HISTORY OF OTHER ENDOCRINE, NUTRITIONAL AND METABOLIC DISEASE: Chronic | Status: ACTIVE | Noted: 2023-08-23

## 2023-09-01 LAB — SURGICAL PATHOLOGY STUDY: SIGNIFICANT CHANGE UP

## 2023-09-05 PROBLEM — Z09 POSTOP CHECK: Status: ACTIVE | Noted: 2023-09-05

## 2023-09-05 NOTE — REASON FOR VISIT
[de-identified] : WEILI LIN is a 57-year-old M, Mandarin speaking, smoker (30y x 1ppd), with PMHx of HLD, who was referred by Dr. Rosangela Gonsalez for an evaluation of an enlarging ground-glass nodule in the right lower lobe.  On 08/25/23 he underwent Right video-assisted thoracoscopic surgery, robotic assisted, Wedge resection of right lower lobe x3 and MLND.   Surgical pathology revealed:  1. Right lower lobe, wedge resection: -  Lung parenchyma with foci of emphysematous changes.  2. Right lower lobe. wedge #2 resection: -  Lung parenchyma with focal non necrotizing granulomata and chronic inflammation.  3. Right lower lobe, wedge #3 resection: -  Lung parenchyma with focal non necrotizing granulomata and chronic inflammation. -  Small focus of atypical adenomatous hyperplasia -AFB and GMS stains are negative for microorganisms (7L)  4. Right lymph node, level 4; level 7; Level 10; level 11; no.2, level 11; no.3, level 11 excision: -  Benign lymph nodes  He presents today for his first post-op visit with CXR.

## 2023-09-05 NOTE — CONSULT LETTER
[Dear  ___] : Dear  [unfilled], [Consult Letter:] : I had the pleasure of evaluating your patient, [unfilled]. [Please see my note below.] : Please see my note below. [Consult Closing:] : Thank you very much for allowing me to participate in the care of this patient.  If you have any questions, please do not hesitate to contact me. [Sincerely,] : Sincerely, [FreeTextEntry3] : Jake Prater MD Professor, Cardiovascular & Thoracic Surgery [employer] [facility] [employer] Director of the [facility]  Director of Thoracic Surgery, [address] [place] [address] Phone: [phone] Fax: [phone]

## 2023-09-08 ENCOUNTER — OUTPATIENT (OUTPATIENT)
Dept: OUTPATIENT SERVICES | Facility: HOSPITAL | Age: 58
LOS: 1 days | End: 2023-09-08
Payer: COMMERCIAL

## 2023-09-08 ENCOUNTER — APPOINTMENT (OUTPATIENT)
Dept: THORACIC SURGERY | Facility: CLINIC | Age: 58
End: 2023-09-08
Payer: COMMERCIAL

## 2023-09-08 VITALS
TEMPERATURE: 97.1 F | OXYGEN SATURATION: 96 % | WEIGHT: 145 LBS | SYSTOLIC BLOOD PRESSURE: 103 MMHG | BODY MASS INDEX: 23.3 KG/M2 | HEART RATE: 78 BPM | HEIGHT: 66 IN | RESPIRATION RATE: 17 BRPM | DIASTOLIC BLOOD PRESSURE: 53 MMHG

## 2023-09-08 DIAGNOSIS — Z09 ENCOUNTER FOR FOLLOW-UP EXAMINATION AFTER COMPLETED TREATMENT FOR CONDITIONS OTHER THAN MALIGNANT NEOPLASM: ICD-10-CM

## 2023-09-08 DIAGNOSIS — R05.9 COUGH, UNSPECIFIED: ICD-10-CM

## 2023-09-08 PROCEDURE — 71046 X-RAY EXAM CHEST 2 VIEWS: CPT

## 2023-09-08 PROCEDURE — 71046 X-RAY EXAM CHEST 2 VIEWS: CPT | Mod: 26

## 2023-09-08 PROCEDURE — 99024 POSTOP FOLLOW-UP VISIT: CPT

## 2023-09-08 RX ORDER — BENZONATATE 100 MG/1
100 CAPSULE ORAL 3 TIMES DAILY
Qty: 21 | Refills: 0 | Status: ACTIVE | COMMUNITY
Start: 2023-09-08 | End: 1900-01-01

## 2023-09-08 NOTE — DISCHARGE NOTE PROVIDER - NSCORESITESY/N_GEN_A_CORE_RD
Detail Level: Detailed Depth Of Biopsy: dermis Was A Bandage Applied: Yes Size Of Lesion In Cm: 0.5 X Size Of Lesion In Cm: 0 Biopsy Type: H and E Biopsy Method: Dermablade Anesthesia Type: 1% lidocaine with epinephrine Hemostasis: Drysol Wound Care: Petrolatum Dressing: bandage Destruction After The Procedure: No No Type Of Destruction Used: Curettage Curettage Text: The wound bed was treated with curettage after the biopsy was performed. Cryotherapy Text: The wound bed was treated with cryotherapy after the biopsy was performed. Electrodesiccation Text: The wound bed was treated with electrodesiccation after the biopsy was performed. Electrodesiccation And Curettage Text: The wound bed was treated with electrodesiccation and curettage after the biopsy was performed. Silver Nitrate Text: The wound bed was treated with silver nitrate after the biopsy was performed. Lab: [id number] Lab Facility: [id number] Consent: Written consent was obtained and risks were reviewed including but not limited to scarring, infection, bleeding, scabbing, incomplete removal, nerve damage and allergy to anesthesia. Post-Care Instructions: I reviewed with the patient in detail post-care instructions. Patient is to keep the biopsy site dry overnight, and then apply bacitracin twice daily until healed. Patient may apply hydrogen peroxide soaks to remove any crusting. Notification Instructions: Patient will be notified of biopsy results. However, patient instructed to call the office if not contacted within 2 weeks. Billing Type: Third-Party Bill Information: Selecting Yes will display possible errors in your note based on the variables you have selected. This validation is only offered as a suggestion for you. PLEASE NOTE THAT THE VALIDATION TEXT WILL BE REMOVED WHEN YOU FINALIZE YOUR NOTE. IF YOU WANT TO FAX A PRELIMINARY NOTE YOU WILL NEED TO TOGGLE THIS TO 'NO' IF YOU DO NOT WANT IT IN YOUR FAXED NOTE.

## 2023-09-11 PROBLEM — R05.9 COUGH: Status: ACTIVE | Noted: 2023-09-11

## 2023-09-23 LAB
CULTURE RESULTS: SIGNIFICANT CHANGE UP
CULTURE RESULTS: SIGNIFICANT CHANGE UP
SPECIMEN SOURCE: SIGNIFICANT CHANGE UP
SPECIMEN SOURCE: SIGNIFICANT CHANGE UP

## 2023-10-11 LAB
CULTURE RESULTS: SIGNIFICANT CHANGE UP
CULTURE RESULTS: SIGNIFICANT CHANGE UP
SPECIMEN SOURCE: SIGNIFICANT CHANGE UP
SPECIMEN SOURCE: SIGNIFICANT CHANGE UP

## (undated) DEVICE — TUBING STRYKER PNEUMOCLEAR HEAT HUMID

## (undated) DEVICE — XI VESSEL SEALER

## (undated) DEVICE — ELCTR GROUNDING PAD ADULT COVIDIEN

## (undated) DEVICE — TROCAR ETHICON ENDOPATH XCEL BLADELESS 12MM X 100MM SMOOTH

## (undated) DEVICE — XI OBTURATOR OPTICAL BLADELESS 8MM

## (undated) DEVICE — DVC ASCOPE 4 SNGL USE SLIM

## (undated) DEVICE — STAPLER COVIDIEN ENDO GIA STANDARD HANDLE

## (undated) DEVICE — TROCAR ETHICON ENDOPATH XCEL BLADELESS 5MM X 100MM STABILITY

## (undated) DEVICE — ELCTR BOVIE TIP BLADE INSULATED 6.5" EDGE

## (undated) DEVICE — SUT PROLENE 0 30" CT-1

## (undated) DEVICE — ELCTR BOVIE TIP BLADE VALLEYLAB 6.5"

## (undated) DEVICE — GLV 7.5 PROTEXIS (WHITE)

## (undated) DEVICE — VENODYNE/SCD SLEEVE CALF MEDIUM

## (undated) DEVICE — CHEST DRAIN PLEUR-EVAC DRY/WET ADULT-PEDS SINGLE (QUICK)

## (undated) DEVICE — XI DRAPE ARM

## (undated) DEVICE — PACK ROBOTIC

## (undated) DEVICE — TAPE SILK 3"

## (undated) DEVICE — ENDOCATCH GENERAL 10MM (PURPLE)

## (undated) DEVICE — ENDOCATCH GENERAL 15MM (PURPLE)

## (undated) DEVICE — TROCAR ETHICON ENDOPATH XCEL BLADELESS 15MM X 100MM STABILITY

## (undated) DEVICE — XI ARM FORCEP FENESTRATED BIPOLAR 8MM

## (undated) DEVICE — ELCTR BOVIE TIP SPATULA MEGADYNE E-Z CLEAN LAPAROSCOPIC 13.5" STANDARD

## (undated) DEVICE — WARMING BLANKET LOWER ADULT

## (undated) DEVICE — D HELP - CLEARVIEW CLEARIFY SYSTEM

## (undated) DEVICE — DRSG GAUZE PACKTNER ROLL

## (undated) DEVICE — XI DRAPE COLUMN

## (undated) DEVICE — XI SEAL UNIV 5- 8 MM

## (undated) DEVICE — Device